# Patient Record
Sex: FEMALE | URBAN - METROPOLITAN AREA
[De-identification: names, ages, dates, MRNs, and addresses within clinical notes are randomized per-mention and may not be internally consistent; named-entity substitution may affect disease eponyms.]

---

## 2021-05-11 ENCOUNTER — TELEPHONE (OUTPATIENT)
Dept: NEUROSURGERY | Facility: CLINIC | Age: 71
End: 2021-05-11

## 2021-05-11 ENCOUNTER — DOCUMENTATION (OUTPATIENT)
Dept: NEUROSURGERY | Facility: CLINIC | Age: 71
End: 2021-05-11

## 2021-05-11 NOTE — TELEPHONE ENCOUNTER
Patient called nurseline stating she is playing phone tag with out office  Returned call to patient and left a voicemail to call at her earliest convenience  Provided office number

## 2023-01-21 ENCOUNTER — OFFICE VISIT (OUTPATIENT)
Age: 73
End: 2023-01-21

## 2023-01-21 VITALS
HEART RATE: 86 BPM | DIASTOLIC BLOOD PRESSURE: 82 MMHG | SYSTOLIC BLOOD PRESSURE: 124 MMHG | TEMPERATURE: 98.6 F | RESPIRATION RATE: 18 BRPM | OXYGEN SATURATION: 98 %

## 2023-01-21 DIAGNOSIS — K04.7 DENTAL ABSCESS: Primary | ICD-10-CM

## 2023-01-21 RX ORDER — DOXYCYCLINE HYCLATE 100 MG
TABLET ORAL
COMMUNITY
Start: 2022-11-14

## 2023-01-21 RX ORDER — AMOXICILLIN 500 MG/1
500 TABLET, FILM COATED ORAL 3 TIMES DAILY
Qty: 21 TABLET | Refills: 0 | Status: SHIPPED | OUTPATIENT
Start: 2023-01-21 | End: 2023-01-28

## 2023-01-21 NOTE — PROGRESS NOTES
[facility]    NAME: Byron Caruso is a 67 y o  female  : 1950    MRN: [id number]  DATE: 2023  TIME: 9:45 AM    Assessment and Plan   Dental abscess [K04 7]  1  Dental abscess  amoxicillin (AMOXIL) 500 MG tablet          Patient Instructions     Patient Instructions     Take antibiotic as instructed  May continue Ibuprofen as needed  Warm salt water swishes  Get in with dentist as soon as possible  Dental Abscess   AMBULATORY CARE:   A dental abscess  is a collection of pus in or around a tooth  A dental abscess is caused by bacteria  The bacteria can enter the tooth when the enamel (outer part of the tooth) is damaged by tooth decay  Bacteria can also enter the tooth through a chip in the tooth or a cut in the gum  Food particles that are stuck between the teeth for a long time may also lead to an abscess  Common signs and symptoms:   · Toothache, a loose tooth, or a tooth that is very sensitive to pressure or temperature    · Bad breath, unpleasant taste, and drooling    · Fever    · Pain, redness, and swelling of the gums, or swelling of your face and neck    · Pain when you open or close your mouth    · Trouble opening your mouth    Seek care immediately if:   · You have severe pain in your tooth or jaw  · You have trouble breathing because of pain or swelling  Call your doctor if:   · Your symptoms get worse, even after treatment  · Your mouth is bleeding  · You cannot eat or drink because of pain or swelling  · Your abscess returns  · You have an injury that causes a crack in your tooth  · You have questions or concerns about your condition or care  Treatment:  You may  need any of the following:  · Medicines  may be given to treat a bacterial infection and decrease pain  · Incision and drainage  is a cut in the abscess to allow the pus to drain  A sample of fluid may be collected from your abscess   The fluid is sent to a lab and tested for bacteria  Ask your healthcare provider for more information  · A root canal  is a procedure to remove the bacteria and prevent more infection  It is usually done after an incision and drainage  A filling or crown will be placed over the tooth after you have healed from your root canal      · Tooth removal  may be needed if the infection affects deeper tissues  This is usually done after an incision and drainage  Self-care:   · Rinse your mouth every 2 hours with salt water  This will help keep the area clean  · Gently brush your teeth twice a day with a soft tooth brush  This will help keep the area clean  · Eat soft foods as directed  Soft foods may cause less pain  Examples include applesauce, yogurt, and cooked pasta  Ask your healthcare provider how long to follow this instruction  · Apply a warm compress to your tooth or gum  Use a cotton ball or gauze soaked in warm water  Remove the compress in 10 minutes or when it becomes cool  Repeat 3 times a day  Prevent another abscess:   · Brush your teeth at least 2 times a day  with fluoride toothpaste  · Use dental floss at least once a day  to clean between your teeth  · Rinse your mouth with water or mouthwash  after meals and snacks  Chew sugarless gum  · Avoid sugary and starchy food that can stick between your teeth  Limit drinks high in sugar, such as soda or fruit juice  · See your dentist every 6 months  for dental cleanings and oral exams  Follow up with your doctor or dentist as directed: Your healthcare provider will need to check your teeth and gums  Write down your questions so you remember to ask them during your visits  © Copyright [employer] 2022 Information is for End User's use only and may not be sold, redistributed or otherwise used for commercial purposes   All illustrations and images included in CareNotes® are the copyrighted property of [employer]  or Jayne Jordan  The above information is an  only  It is not intended as medical advice for individual conditions or treatments  Talk to your doctor, nurse or pharmacist before following any medical regimen to see if it is safe and effective for you  Chief Complaint     Chief Complaint   Patient presents with   • Dental Pain     Patient with a  bad tooth to the upper left side of mouth since THursday  Now has swelling and pain       History of Present Illness   Caridad Steve presents to the clinic c/o  66 yo female comes in with dental pain  Started Thursday  No fever or chills but upper left molar and gum area swollen red tender and now pain is going into her ear  Has been taking ibuprofen  Does have good dentist and will be contacting her on Monday  Patient says she tends to neglect herself because 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, 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  Dental Pain         Review of Systems   Review of Systems   Constitutional: Negative  HENT: Positive for dental problem and ear pain  Negative for ear discharge  Hematological: Negative for adenopathy  Current Medications     Long-Term Medications   Medication Sig Dispense Refill   • Cholecalciferol 50 MCG (2000 UT) CAPS Take 1 capsule by mouth daily         Current Allergies     Allergies as of 01/21/2023 - Reviewed 01/21/2023   Allergen Reaction Noted   • Codeine GI Intolerance 01/21/2023          The following portions of the patient's history were reviewed and updated as appropriate: allergies, current medications, past family history, past medical history, past social history, past surgical history and problem list   History reviewed  No pertinent past medical history  Past Surgical History:   Procedure Laterality Date   • CHOLECYSTECTOMY       History reviewed  No pertinent family history      Objective   /82   Pulse 86   Temp 98 6 °F (37 °C) (Tympanic)   Resp 18   SpO2 98% No LMP recorded  Patient is postmenopausal        Physical Exam     Physical Exam  Vitals and nursing note reviewed  Constitutional:       General: She is not in acute distress  Appearance: Normal appearance  She is well-developed  She is not ill-appearing, toxic-appearing or diaphoretic  Comments: Very pleasant   HENT:      Head: Normocephalic and atraumatic  Right Ear: Tympanic membrane, ear canal and external ear normal  There is no impacted cerumen  Left Ear: Tympanic membrane, ear canal and external ear normal  There is no impacted cerumen  Mouth/Throat:     Cardiovascular:      Rate and Rhythm: Normal rate  Pulmonary:      Effort: Pulmonary effort is normal  No respiratory distress  Musculoskeletal:      Cervical back: Normal range of motion and neck supple  No rigidity or tenderness  Lymphadenopathy:      Cervical: No cervical adenopathy  Skin:     General: Skin is warm and dry  Findings: No bruising, erythema or lesion  Neurological:      Mental Status: She is alert and oriented to person, place, and time     Psychiatric:         Mood and Affect: Mood normal          Behavior: Behavior normal

## 2023-01-21 NOTE — PATIENT INSTRUCTIONS
Take antibiotic as instructed  May continue Ibuprofen as needed  Warm salt water swishes  Get in with dentist as soon as possible  Dental Abscess   AMBULATORY CARE:   A dental abscess  is a collection of pus in or around a tooth  A dental abscess is caused by bacteria  The bacteria can enter the tooth when the enamel (outer part of the tooth) is damaged by tooth decay  Bacteria can also enter the tooth through a chip in the tooth or a cut in the gum  Food particles that are stuck between the teeth for a long time may also lead to an abscess  Common signs and symptoms:   Toothache, a loose tooth, or a tooth that is very sensitive to pressure or temperature    Bad breath, unpleasant taste, and drooling    Fever    Pain, redness, and swelling of the gums, or swelling of your face and neck    Pain when you open or close your mouth    Trouble opening your mouth    Seek care immediately if:   You have severe pain in your tooth or jaw  You have trouble breathing because of pain or swelling  Call your doctor if:   Your symptoms get worse, even after treatment  Your mouth is bleeding  You cannot eat or drink because of pain or swelling  Your abscess returns  You have an injury that causes a crack in your tooth  You have questions or concerns about your condition or care  Treatment:  You may  need any of the following:  Medicines  may be given to treat a bacterial infection and decrease pain  Incision and drainage  is a cut in the abscess to allow the pus to drain  A sample of fluid may be collected from your abscess  The fluid is sent to a lab and tested for bacteria  Ask your healthcare provider for more information  A root canal  is a procedure to remove the bacteria and prevent more infection  It is usually done after an incision and drainage   A filling or crown will be placed over the tooth after you have healed from your root canal      Tooth removal  may be needed if the infection affects deeper tissues  This is usually done after an incision and drainage  Self-care:   Rinse your mouth every 2 hours with salt water  This will help keep the area clean  Gently brush your teeth twice a day with a soft tooth brush  This will help keep the area clean  Eat soft foods as directed  Soft foods may cause less pain  Examples include applesauce, yogurt, and cooked pasta  Ask your healthcare provider how long to follow this instruction  Apply a warm compress to your tooth or gum  Use a cotton ball or gauze soaked in warm water  Remove the compress in 10 minutes or when it becomes cool  Repeat 3 times a day  Prevent another abscess:   Brush your teeth at least 2 times a day  with fluoride toothpaste  Use dental floss at least once a day  to clean between your teeth  Rinse your mouth with water or mouthwash  after meals and snacks  Chew sugarless gum  Avoid sugary and starchy food that can stick between your teeth  Limit drinks high in sugar, such as soda or fruit juice  See your dentist every 6 months  for dental cleanings and oral exams  Follow up with your doctor or dentist as directed: Your healthcare provider will need to check your teeth and gums  Write down your questions so you remember to ask them during your visits  © Copyright [employer] 2022 Information is for End User's use only and may not be sold, redistributed or otherwise used for commercial purposes  All illustrations and images included in CareNotes® are the copyrighted property of [employer]  or Jayne Jordan  The above information is an  only  It is not intended as medical advice for individual conditions or treatments  Talk to your doctor, nurse or pharmacist before following any medical regimen to see if it is safe and effective for you

## 2023-05-30 ENCOUNTER — OFFICE VISIT (OUTPATIENT)
Age: 73
End: 2023-05-30

## 2023-05-30 VITALS
WEIGHT: 135 LBS | DIASTOLIC BLOOD PRESSURE: 76 MMHG | TEMPERATURE: 99.4 F | HEIGHT: 64 IN | SYSTOLIC BLOOD PRESSURE: 120 MMHG | BODY MASS INDEX: 23.05 KG/M2 | OXYGEN SATURATION: 98 % | RESPIRATION RATE: 18 BRPM | HEART RATE: 98 BPM

## 2023-05-30 DIAGNOSIS — B96.89 ACUTE BRONCHITIS, BACTERIAL: Primary | ICD-10-CM

## 2023-05-30 DIAGNOSIS — J20.8 ACUTE BRONCHITIS, BACTERIAL: Primary | ICD-10-CM

## 2023-05-30 DIAGNOSIS — J06.9 ACUTE URI: ICD-10-CM

## 2023-05-30 RX ORDER — AZITHROMYCIN 250 MG/1
TABLET, FILM COATED ORAL
Qty: 6 TABLET | Refills: 0 | Status: SHIPPED | OUTPATIENT
Start: 2023-05-30 | End: 2023-06-03

## 2023-05-30 RX ORDER — BENZONATATE 200 MG/1
200 CAPSULE ORAL 3 TIMES DAILY PRN
Qty: 20 CAPSULE | Refills: 0 | Status: SHIPPED | OUTPATIENT
Start: 2023-05-30

## 2023-05-30 NOTE — PATIENT INSTRUCTIONS
Start and complete course of antibiotics  Take Tessalon every 8 hours as needed for cough  For decongestion:  Claritin-D or Zyrtec-D  Nasal corticosteroid: examples are Flonase or Nasacort  For Cough or sore throat:  Salt water gurgle  Honey  Chloraseptic spray  Throat lozenges  Tessalon Perles    Follow up with PCP in 3-5 days  Proceed to ER if symptoms worsen

## 2023-05-30 NOTE — PROGRESS NOTES
[facility]        NAME: Domenica Smallwood is a 67 y o  female  : 1950    MRN: [id number]  DATE: May 30, 2023  TIME: 12:48 PM    Assessment and Plan   Acute bronchitis, bacterial [J20 8, B96 89]  1  Acute bronchitis, bacterial  benzonatate (TESSALON) 200 MG capsule    azithromycin (ZITHROMAX) 250 mg tablet      2  Acute URI  benzonatate (TESSALON) 200 MG capsule    azithromycin (ZITHROMAX) 250 mg tablet            Patient Instructions   · Start and complete course of antibiotics  · Take Tessalon every 8 hours as needed for cough  • For decongestion:  o Claritin-D or Zyrtec-D  o Nasal corticosteroid: examples are Flonase or Nasacort  • For Cough or sore throat:  o Salt water gurgle  o Honey  o Chloraseptic spray  o Throat lozenges  o Tessalon Perles    • Follow up with PCP in 3-5 days  • Proceed to ER if symptoms worsen  Chief Complaint     Chief Complaint   Patient presents with   • Cold Like Symptoms     Chest congestion,nasal congestion and cough x 1 week          History of Present Illness       67year old female who reports she had congestion, runny nose, sore throat, and cough for about 7 days  States that about 2-3 days ago symptoms had worsen into her upper chest  States no fever but does take her daughter to the infusion center several times a week  Reports she has done salt water gurgles, honey, nasal rinse all without resolution of symptoms  Review of Systems   Review of Systems   Constitutional: Negative for chills and fever  HENT: Positive for congestion, postnasal drip and sore throat  Negative for dental problem and ear pain  Eyes: Negative for pain and visual disturbance  Respiratory: Positive for cough  Negative for shortness of breath  Cardiovascular: Negative for chest pain and palpitations  Gastrointestinal: Negative for abdominal pain and vomiting  Genitourinary: Negative for dysuria and hematuria  Musculoskeletal: Negative for arthralgias and back pain  "  Skin: Negative for color change and rash  Neurological: Negative for dizziness, seizures, syncope, weakness and numbness  All other systems reviewed and are negative  Current Medications       Current Outpatient Medications:   •  azithromycin (ZITHROMAX) 250 mg tablet, Take 2 tablets today then 1 tablet daily x 4 days, Disp: 6 tablet, Rfl: 0  •  benzonatate (TESSALON) 200 MG capsule, Take 1 capsule (200 mg total) by mouth 3 (three) times a day as needed for cough, Disp: 20 capsule, Rfl: 0  •  Cholecalciferol 50 MCG (2000 UT) CAPS, Take 1 capsule by mouth daily, Disp: , Rfl:   •  Multiple Vitamin (MULTIVITAMIN ADULT PO), Take by mouth, Disp: , Rfl:   •  Omega-3 Fatty Acids (FISH OIL OMEGA-3 PO), Take 1 g by mouth daily, Disp: , Rfl:   •  doxycycline hyclate (VIBRA-TABS) 100 mg tablet, TAKE 1 TABLET BY MOUTH TWICE A DAY FOR 14 DAYS (Patient not taking: Reported on 1/21/2023), Disp: , Rfl:     Current Allergies     Allergies as of 05/30/2023 - Reviewed 05/30/2023   Allergen Reaction Noted   • Codeine GI Intolerance 01/21/2023            The following portions of the patient's history were reviewed and updated as appropriate: allergies, current medications, past family history, past medical history, past social history, past surgical history and problem list      History reviewed  No pertinent past medical history  Past Surgical History:   Procedure Laterality Date   • CHOLECYSTECTOMY     • FRACTURE SURGERY Left     left arm       History reviewed  No pertinent family history  Medications have been verified  Objective   /76   Pulse 98   Temp 99 4 °F (37 4 °C) (Tympanic)   Resp 18   Ht 5' 4\" (1 626 m)   Wt 61 2 kg (135 lb)   SpO2 98%   BMI 23 17 kg/m²   No LMP recorded  Patient is postmenopausal        Physical Exam     Physical Exam  Vitals reviewed  HENT:      Head: Normocephalic        Right Ear: Tympanic membrane normal       Left Ear: Tympanic membrane normal       Nose: " Congestion and rhinorrhea present  Mouth/Throat:      Mouth: Mucous membranes are moist    Eyes:      Pupils: Pupils are equal, round, and reactive to light  Cardiovascular:      Rate and Rhythm: Normal rate  Pulses: Normal pulses  Heart sounds: Normal heart sounds  Pulmonary:      Effort: Pulmonary effort is normal       Breath sounds: Normal breath sounds  Abdominal:      General: Bowel sounds are normal       Tenderness: There is no abdominal tenderness  Musculoskeletal:         General: Normal range of motion  Cervical back: Normal range of motion and neck supple  Skin:     General: Skin is dry  Capillary Refill: Capillary refill takes less than 2 seconds  Neurological:      General: No focal deficit present  Mental Status: She is alert and oriented to person, place, and time  Mental status is at baseline     Psychiatric:         Mood and Affect: Mood normal          Behavior: Behavior normal

## 2024-03-06 ENCOUNTER — OFFICE VISIT (OUTPATIENT)
Age: 74
End: 2024-03-06
Payer: COMMERCIAL

## 2024-03-06 VITALS
HEART RATE: 73 BPM | RESPIRATION RATE: 16 BRPM | HEIGHT: 64 IN | DIASTOLIC BLOOD PRESSURE: 86 MMHG | OXYGEN SATURATION: 97 % | BODY MASS INDEX: 22.2 KG/M2 | WEIGHT: 130 LBS | TEMPERATURE: 98.1 F | SYSTOLIC BLOOD PRESSURE: 150 MMHG

## 2024-03-06 DIAGNOSIS — R68.89 FLU-LIKE SYMPTOMS: Primary | ICD-10-CM

## 2024-03-06 LAB
SARS-COV-2 AG UPPER RESP QL IA: NEGATIVE
VALID CONTROL: NORMAL

## 2024-03-06 PROCEDURE — 99213 OFFICE O/P EST LOW 20 MIN: CPT | Performed by: EMERGENCY MEDICINE

## 2024-03-06 PROCEDURE — 87811 SARS-COV-2 COVID19 W/OPTIC: CPT | Performed by: EMERGENCY MEDICINE

## 2024-03-06 RX ORDER — PREDNISONE 10 MG/1
TABLET ORAL
Qty: 24 TABLET | Refills: 0 | Status: SHIPPED | OUTPATIENT
Start: 2024-03-06

## 2024-03-06 RX ORDER — BENZONATATE 200 MG/1
200 CAPSULE ORAL
Qty: 12 CAPSULE | Refills: 0 | Status: SHIPPED | OUTPATIENT
Start: 2024-03-06

## 2024-03-06 NOTE — PROGRESS NOTES
[facility]        NAME: Pauline Menon is a 73 y.o. female  : 1950    MRN: [id number]  DATE: 2024  TIME: 2:19 PM    Assessment and Plan   Flu-like symptoms [R68.89]  1. Flu-like symptoms  Poct Covid 19 Rapid Antigen Test    predniSONE 10 mg tablet    benzonatate (TESSALON) 200 MG capsule            Patient Instructions     Patient Instructions   You have been diagnosed with a Flu like illness and your symptoms should resolve over the next 7 to 10 days with the treatments recommended today.  If they do not, it is possible that you have developed a bacterial infection and you should return. If you were to take an antibiotic while you are still in the viral stage, you will not get better any faster, but could kill off good germs in your body as well as make germs resistant to the antibiotic.  Take an expectorant - guaifenesin should be the only ingredient - during the day, and the cough suppressant (ex. Robitussin DM or Tessalon) if needed at night only.   Take Zinc 25 mg every 12 hours for the next week (the dose is important so do not just take a multivitamin with zinc or an over-the-counter cold med with zinc such as Airborne or Zicam, as that will not give you the sufficient dose).  You should also take   You should also take vitamin D3 5000 i.u.s per day for the next 1 week, and vitamin-C 1000 mg twice daily (and again dosages are important, do not think you are getting enough vitamin C just by drinking extra orange juice).  May take Flonase as discussed.  You may also take a decongestant like Sudafed, unless you have hypertension or cardiac disease.  You may take Imodium for diarrhea according to package instructions.     If you are diabetic you should adhere strictly to your diabetic diet and monitor blood sugar closely while on prednisone and you should discontinue the prednisone if blood sugar becomes significantly elevated.  Avoid nonsteroidal anti-inflammatories like Advil or  Aleve while on prednisone.       Flu-like illness   AMBULATORY CARE:   Flu-like illness is an infection caused by a virus. The flu is easily spread when an infected person coughs, sneezes, or has close contact with others. You may be able to spread the flu to others for 1 week or longer after signs or symptoms appear.   Common signs and symptoms include the following:   Fever and chills    Headaches, body aches, and muscle or joint pain    Cough, runny nose, and sore throat    Loss of appetite, nausea, vomiting, or diarrhea    Tiredness    Trouble breathing  Call 911 for any of the following:   You have trouble breathing, and your lips look purple or blue.    You have a seizure.  Seek care immediately if:   You are dizzy, or you are urinating less or not at all.     You have a headache with a stiff neck, and you feel tired or confused.    You have new pain or pressure in your chest.    Your symptoms, such as shortness of breath, vomiting, or diarrhea, get worse.     Your symptoms, such as fever and coughing, seem to get better, but then get worse.  Contact your healthcare provider if:   You have new muscle pain or weakness.    You have questions or concerns about your condition or care.  Treatment for influenza  may include any of the following:  Acetaminophen decreases pain and fever. It is available without a doctor's order. Ask how much to take and how often to take it. Follow directions. Acetaminophen can cause liver damage if not taken correctly.    NSAIDs  such as ibuprofen, help decrease swelling, pain, and fever. This medicine is available with or without a doctor's order. NSAIDs can cause stomach bleeding or kidney problems in certain people. If you take blood thinner medicine, always ask your healthcare provider if NSAIDs are safe for you. Always read the medicine label and follow directions.    Antivirals  help fight a viral infection.  Manage your symptoms:   Rest  as much as you can to help you  recover.    Drink liquids as directed  to help prevent dehydration. Ask how much liquid to drink each day and which liquids are best for you.  Prevent the spread of the flu:   Wash your hands often.  Use soap and water. Wash your hands after you use the bathroom, change a child's diapers, or sneeze. Wash your hands before you prepare or eat food. Use gel hand cleanser when soap and water are not available. Do not touch your eyes, nose, or mouth unless you have washed your hands first.       Cover your mouth when you sneeze or cough.  Cough into a tissue or the bend of your arm.    Clean shared items with a germ-killing .  Clean table surfaces, doorknobs, and light switches. Do not share towels, silverware, and dishes with people who are sick. Wash bed sheets, towels, silverware, and dishes with soap and water.     Wear a mask  over your mouth and nose if you are sick or are near anyone who is sick.     Stay away from others  if you are sick.     Follow up with your healthcare provider as directed:  Write down your questions so you remember to ask them during your visits.   © 2017 [employer] Information is for End User's use only and may not be sold, redistributed or otherwise used for commercial purposes. All illustrations and images included in CareNotes® are the copyrighted property of [employer] or [employer].  The above information is an  only. It is not intended as medical advice for individual conditions or treatments. Talk to your doctor, nurse or pharmacist before following any medical regimen to see if it is safe and effective for you.       COVID-19    COVID-19 Home Care Guidelines     Your healthcare provider and/or public health staff have evaluated you and have determined that you do not need to be hospitalized at this time.  At this time you can be isolated at home where you will be monitored by staff from your local or state health department.  You should carefully follow the prevention and isolation steps below until a healthcare provider or local or state health department says that you can return to your normal activities.        Stay home except to get medical care     People who are mildly ill with COVID-19 are able to isolate at home during their illness. You should restrict activities outside your home, except for getting medical care. Do not go to work, school, or public areas. Avoid using public transportation, ride-sharing, or taxis.     Separate yourself from other people and animals in your home     People: As much as possible, you should stay in a specific room and away from other people in your home. Also, you should use a separate bathroom, if available.  Animals: You should restrict contact with pets and other animals while you are sick with COVID-19, just like you would around other people. Although there have not been reports of pets or other animals becoming sick with COVID-19, it is still recommended that people sick with COVID-19 limit contact with animals until more information is known about the virus. When possible, have another member of your household care for your animals while you are sick. If you are sick with COVID-19, avoid contact with your pet, including petting, snuggling, being kissed or licked, and sharing food. If you must care for your pet or be around animals while you are sick, wash your hands before and after you interact with pets and wear a facemask. See COVID-19 and Animals for more information.     Call ahead before visiting your doctor     If you have a medical appointment, call the healthcare provider and tell them that you have or may have COVID-19. This will help the healthcare provider's office take steps to keep other people from getting infected or exposed.     Wear a facemask     You should wear a facemask when you are around other people (e.g., sharing a room or vehicle) or pets and before you enter a  healthcare provider's office. If you are not able to wear a facemask (for example, because it causes trouble breathing), then people who live with you should not stay in the same room with you, or they should wear a facemask if they enter your room.     Cover your coughs and sneezes     Cover your mouth and nose with a tissue when you cough or sneeze. Throw used tissues in a lined trash can. Immediately wash your hands with soap and water for at least 20 seconds or, if soap and water are not available, clean your hands with an alcohol-based hand  that contains at least 60% alcohol.     Clean your hands often     Wash your hands often with soap and water for at least 20 seconds, especially after blowing your nose, coughing, or sneezing; going to the bathroom; and before eating or preparing food. If soap and water are not readily available, use an alcohol-based hand  with at least 60% alcohol, covering all surfaces of your hands and rubbing them together until they feel dry.  Soap and water are the best option if hands are visibly dirty. Avoid touching your eyes, nose, and mouth with unwashed hands.     Avoid sharing personal household items     You should not share dishes, drinking glasses, cups, eating utensils, towels, or bedding with other people or pets in your home. After using these items, they should be washed thoroughly with soap and water.     Clean all “high-touch” surfaces everyday     High touch surfaces include counters, tabletops, doorknobs, bathroom fixtures, toilets, phones, keyboards, tablets, and bedside tables. Also, clean any surfaces that may have blood, stool, or body fluids on them. Use a household cleaning spray or wipe, according to the label instructions. Labels contain instructions for safe and effective use of the cleaning product including precautions you should take when applying the product, such as wearing gloves and making sure you have good ventilation during use of  the product.     Monitor your symptoms     Seek prompt medical attention if your illness is worsening (e.g., difficulty breathing). Before seeking care, call your healthcare provider and tell them that you have, or are being evaluated for, COVID-19. Put on a facemask before you enter the facility. These steps will help the healthcare provider's office to keep other people in the office or waiting room from getting infected or exposed. Ask your healthcare provider to call the local or state health department. Persons who are placed under active monitoring or facilitated self-monitoring should follow instructions provided by their local health department or occupational health professionals, as appropriate.  If you have a medical emergency and need to call 911, notify the dispatch personnel that you have, or are being evaluated for COVID-19. If possible, put on a facemask before emergency medical services arrive.        Proceed to ER if symptoms worsen        Follow up with PCP in 3-5 days.  Proceed to  ER if symptoms worsen.    Chief Complaint     Chief Complaint   Patient presents with    Cold Like Symptoms     Started last night after 5pm with sinus congestion and chest congestion, cough, low grade fever, slight body aches. Tried home remedies including throat gargle, nasal spray (flonase), advil.          History of Present Illness       Patient complains of cough, congestion, fevers, generalized aches since last evening.         Review of Systems   Review of Systems   Constitutional:  Positive for fever. Negative for appetite change, chills and fatigue.   HENT:  Positive for congestion, rhinorrhea, sinus pressure and sore throat. Negative for trouble swallowing and voice change.    Respiratory:  Positive for cough. Negative for chest tightness, shortness of breath and wheezing.    Cardiovascular:  Negative for chest pain.   Gastrointestinal:  Negative for nausea.   Musculoskeletal:  Positive for myalgias.  "        Current Medications       Current Outpatient Medications:     benzonatate (TESSALON) 200 MG capsule, Take 1 capsule (200 mg total) by mouth daily at bedtime as needed for cough, Disp: 12 capsule, Rfl: 0    Cholecalciferol 50 MCG (2000 UT) CAPS, Take 1 capsule by mouth daily, Disp: , Rfl:     Multiple Vitamin (MULTIVITAMIN ADULT PO), Take by mouth, Disp: , Rfl:     Omega-3 Fatty Acids (FISH OIL OMEGA-3 PO), Take 1 g by mouth daily, Disp: , Rfl:     predniSONE 10 mg tablet, Take once daily all days pills on this schedule 5- 5- 4- 4- 3- 2- 1, Disp: 24 tablet, Rfl: 0    benzonatate (TESSALON) 200 MG capsule, Take 1 capsule (200 mg total) by mouth 3 (three) times a day as needed for cough (Patient not taking: Reported on 3/6/2024), Disp: 20 capsule, Rfl: 0    doxycycline hyclate (VIBRA-TABS) 100 mg tablet, TAKE 1 TABLET BY MOUTH TWICE A DAY FOR 14 DAYS (Patient not taking: Reported on 1/21/2023), Disp: , Rfl:     Current Allergies     Allergies as of 03/06/2024 - Reviewed 03/06/2024   Allergen Reaction Noted    Codeine GI Intolerance 01/21/2023            The following portions of the patient's history were reviewed and updated as appropriate: allergies, current medications, past family history, past medical history, past social history, past surgical history and problem list.     History reviewed. No pertinent past medical history.    Past Surgical History:   Procedure Laterality Date    CHOLECYSTECTOMY      FRACTURE SURGERY Left     left arm       History reviewed. No pertinent family history.      Medications have been verified.        Objective   /86   Pulse 73   Temp 98.1 °F (36.7 °C)   Resp 16   Ht 5' 4\" (1.626 m)   Wt 59 kg (130 lb)   SpO2 97%   BMI 22.31 kg/m²        Physical Exam     Physical Exam  Vitals and nursing note reviewed.   Constitutional:       General: She is not in acute distress.     Appearance: She is well-developed. She is not ill-appearing or toxic-appearing.   HENT:      " Head: Normocephalic and atraumatic.      Right Ear: External ear normal.      Left Ear: External ear normal.      Nose: Mucosal edema and congestion present.      Mouth/Throat:      Pharynx: Posterior oropharyngeal erythema present. No oropharyngeal exudate.      Tonsils: No tonsillar abscesses.   Cardiovascular:      Rate and Rhythm: Normal rate and regular rhythm.   Pulmonary:      Effort: Pulmonary effort is normal. No respiratory distress.      Breath sounds: No wheezing or rales.   Musculoskeletal:      Cervical back: Neck supple.   Skin:     General: Skin is warm and dry.   Neurological:      Mental Status: She is alert and oriented to person, place, and time.   Psychiatric:         Mood and Affect: Mood normal.         Behavior: Behavior normal.         Thought Content: Thought content normal.         Judgment: Judgment normal.

## 2024-03-06 NOTE — PATIENT INSTRUCTIONS
You have been diagnosed with a Flu like illness and your symptoms should resolve over the next 7 to 10 days with the treatments recommended today.  If they do not, it is possible that you have developed a bacterial infection and you should return. If you were to take an antibiotic while you are still in the viral stage, you will not get better any faster, but could kill off good germs in your body as well as make germs resistant to the antibiotic.  Take an expectorant - guaifenesin should be the only ingredient - during the day, and the cough suppressant (ex. Robitussin DM or Tessalon) if needed at night only.   Take Zinc 25 mg every 12 hours for the next week (the dose is important so do not just take a multivitamin with zinc or an over-the-counter cold med with zinc such as Airborne or Zicam, as that will not give you the sufficient dose).  You should also take   You should also take vitamin D3 5000 i.u.s per day for the next 1 week, and vitamin-C 1000 mg twice daily (and again dosages are important, do not think you are getting enough vitamin C just by drinking extra orange juice).  May take Flonase as discussed.  You may also take a decongestant like Sudafed, unless you have hypertension or cardiac disease.  You may take Imodium for diarrhea according to package instructions.     If you are diabetic you should adhere strictly to your diabetic diet and monitor blood sugar closely while on prednisone and you should discontinue the prednisone if blood sugar becomes significantly elevated.  Avoid nonsteroidal anti-inflammatories like Advil or Aleve while on prednisone.       Flu-like illness   AMBULATORY CARE:   Flu-like illness is an infection caused by a virus. The flu is easily spread when an infected person coughs, sneezes, or has close contact with others. You may be able to spread the flu to others for 1 week or longer after signs or symptoms appear.   Common signs and symptoms include the following:   Fever  and chills    Headaches, body aches, and muscle or joint pain    Cough, runny nose, and sore throat    Loss of appetite, nausea, vomiting, or diarrhea    Tiredness    Trouble breathing  Call 911 for any of the following:   You have trouble breathing, and your lips look purple or blue.    You have a seizure.  Seek care immediately if:   You are dizzy, or you are urinating less or not at all.     You have a headache with a stiff neck, and you feel tired or confused.    You have new pain or pressure in your chest.    Your symptoms, such as shortness of breath, vomiting, or diarrhea, get worse.     Your symptoms, such as fever and coughing, seem to get better, but then get worse.  Contact your healthcare provider if:   You have new muscle pain or weakness.    You have questions or concerns about your condition or care.  Treatment for influenza  may include any of the following:  Acetaminophen decreases pain and fever. It is available without a doctor's order. Ask how much to take and how often to take it. Follow directions. Acetaminophen can cause liver damage if not taken correctly.    NSAIDs  such as ibuprofen, help decrease swelling, pain, and fever. This medicine is available with or without a doctor's order. NSAIDs can cause stomach bleeding or kidney problems in certain people. If you take blood thinner medicine, always ask your healthcare provider if NSAIDs are safe for you. Always read the medicine label and follow directions.    Antivirals  help fight a viral infection.  Manage your symptoms:   Rest  as much as you can to help you recover.    Drink liquids as directed  to help prevent dehydration. Ask how much liquid to drink each day and which liquids are best for you.  Prevent the spread of the flu:   Wash your hands often.  Use soap and water. Wash your hands after you use the bathroom, change a child's diapers, or sneeze. Wash your hands before you prepare or eat food. Use gel hand cleanser when soap and  water are not available. Do not touch your eyes, nose, or mouth unless you have washed your hands first.       Cover your mouth when you sneeze or cough.  Cough into a tissue or the bend of your arm.    Clean shared items with a germ-killing .  Clean table surfaces, doorknobs, and light switches. Do not share towels, silverware, and dishes with people who are sick. Wash bed sheets, towels, silverware, and dishes with soap and water.     Wear a mask  over your mouth and nose if you are sick or are near anyone who is sick.     Stay away from others  if you are sick.     Follow up with your healthcare provider as directed:  Write down your questions so you remember to ask them during your visits.   © 2017 [employer] Information is for End User's use only and may not be sold, redistributed or otherwise used for commercial purposes. All illustrations and images included in CareNotes® are the copyrighted property of [employer]. or [employer].  The above information is an  only. It is not intended as medical advice for individual conditions or treatments. Talk to your doctor, nurse or pharmacist before following any medical regimen to see if it is safe and effective for you.       COVID-19    COVID-19 Home Care Guidelines     Your healthcare provider and/or public health staff have evaluated you and have determined that you do not need to be hospitalized at this time.  At this time you can be isolated at home where you will be monitored by staff from your local or state health department. You should carefully follow the prevention and isolation steps below until a healthcare provider or local or state health department says that you can return to your normal activities.        Stay home except to get medical care     People who are mildly ill with COVID-19 are able to isolate at home during their illness. You should restrict activities outside your home, except for  getting medical care. Do not go to work, school, or public areas. Avoid using public transportation, ride-sharing, or taxis.     Separate yourself from other people and animals in your home     People: As much as possible, you should stay in a specific room and away from other people in your home. Also, you should use a separate bathroom, if available.  Animals: You should restrict contact with pets and other animals while you are sick with COVID-19, just like you would around other people. Although there have not been reports of pets or other animals becoming sick with COVID-19, it is still recommended that people sick with COVID-19 limit contact with animals until more information is known about the virus. When possible, have another member of your household care for your animals while you are sick. If you are sick with COVID-19, avoid contact with your pet, including petting, snuggling, being kissed or licked, and sharing food. If you must care for your pet or be around animals while you are sick, wash your hands before and after you interact with pets and wear a facemask. See COVID-19 and Animals for more information.     Call ahead before visiting your doctor     If you have a medical appointment, call the healthcare provider and tell them that you have or may have COVID-19. This will help the healthcare provider's office take steps to keep other people from getting infected or exposed.     Wear a facemask     You should wear a facemask when you are around other people (e.g., sharing a room or vehicle) or pets and before you enter a healthcare provider's office. If you are not able to wear a facemask (for example, because it causes trouble breathing), then people who live with you should not stay in the same room with you, or they should wear a facemask if they enter your room.     Cover your coughs and sneezes     Cover your mouth and nose with a tissue when you cough or sneeze. Throw used tissues in a lined  trash can. Immediately wash your hands with soap and water for at least 20 seconds or, if soap and water are not available, clean your hands with an alcohol-based hand  that contains at least 60% alcohol.     Clean your hands often     Wash your hands often with soap and water for at least 20 seconds, especially after blowing your nose, coughing, or sneezing; going to the bathroom; and before eating or preparing food. If soap and water are not readily available, use an alcohol-based hand  with at least 60% alcohol, covering all surfaces of your hands and rubbing them together until they feel dry.  Soap and water are the best option if hands are visibly dirty. Avoid touching your eyes, nose, and mouth with unwashed hands.     Avoid sharing personal household items     You should not share dishes, drinking glasses, cups, eating utensils, towels, or bedding with other people or pets in your home. After using these items, they should be washed thoroughly with soap and water.     Clean all “high-touch” surfaces everyday     High touch surfaces include counters, tabletops, doorknobs, bathroom fixtures, toilets, phones, keyboards, tablets, and bedside tables. Also, clean any surfaces that may have blood, stool, or body fluids on them. Use a household cleaning spray or wipe, according to the label instructions. Labels contain instructions for safe and effective use of the cleaning product including precautions you should take when applying the product, such as wearing gloves and making sure you have good ventilation during use of the product.     Monitor your symptoms     Seek prompt medical attention if your illness is worsening (e.g., difficulty breathing). Before seeking care, call your healthcare provider and tell them that you have, or are being evaluated for, COVID-19. Put on a facemask before you enter the facility. These steps will help the healthcare provider's office to keep other people in the  office or waiting room from getting infected or exposed. Ask your healthcare provider to call the local or state health department. Persons who are placed under active monitoring or facilitated self-monitoring should follow instructions provided by their local health department or occupational health professionals, as appropriate.  If you have a medical emergency and need to call 911, notify the dispatch personnel that you have, or are being evaluated for COVID-19. If possible, put on a facemask before emergency medical services arrive.        Proceed to ER if symptoms worsen

## 2024-09-11 ENCOUNTER — OFFICE VISIT (OUTPATIENT)
Age: 74
End: 2024-09-11
Payer: COMMERCIAL

## 2024-09-11 VITALS
HEIGHT: 64 IN | HEART RATE: 72 BPM | RESPIRATION RATE: 16 BRPM | TEMPERATURE: 97.6 F | SYSTOLIC BLOOD PRESSURE: 154 MMHG | WEIGHT: 132.94 LBS | BODY MASS INDEX: 22.7 KG/M2 | DIASTOLIC BLOOD PRESSURE: 74 MMHG | OXYGEN SATURATION: 97 %

## 2024-09-11 DIAGNOSIS — K04.7 DENTAL INFECTION: ICD-10-CM

## 2024-09-11 DIAGNOSIS — B96.89 ACUTE BACTERIAL SINUSITIS: Primary | ICD-10-CM

## 2024-09-11 DIAGNOSIS — J01.90 ACUTE BACTERIAL SINUSITIS: Primary | ICD-10-CM

## 2024-09-11 PROCEDURE — 99213 OFFICE O/P EST LOW 20 MIN: CPT

## 2024-09-11 RX ORDER — AMOXICILLIN 875 MG
875 TABLET ORAL 2 TIMES DAILY
Qty: 14 TABLET | Refills: 0 | Status: SHIPPED | OUTPATIENT
Start: 2024-09-11 | End: 2024-09-18

## 2024-09-11 NOTE — PROGRESS NOTES
[facility]        NAME: Pauline Menon is a 74 y.o. female  : 1950    MRN: [id number]  DATE: 2024  TIME: 10:05 AM    Assessment and Plan   Acute bacterial sinusitis [J01.90, B96.89]  1. Acute bacterial sinusitis  amoxicillin (AMOXIL) 875 mg tablet      2. Dental infection  amoxicillin (AMOXIL) 875 mg tablet            Patient Instructions   Take antibiotics as prescribed  For decongestion, Over The Counter medications:  2nd Generation antihistamines with a decongestant such as:   Claritin-D (Loratadine with Pseudoephedrine) or  Zyrtec-D (Cetirizine with Pseudoephedrine) or   Allegra-D (Fexofenadine with Pseudoephedrine) or   Decongestant alone: Pseudoephedrine 60mg PO every 4-6 hours for nasal congestion. Max 240mg in 24 hour period  Claritin or Zyrtec plus -Coricidin HBP (Safe for when you have high blood pressure)  Nasal corticosteroid: examples are Flonase or Nasacort.  Nasal saline irrigation  Humidified air  Warm moist air such as a hot cup of water in a mug, sit at the dining room table with the mug on the table, put a towel over your head to cover over the mug and breath in the warm steam (don't drink the fluid in case you have mucus that drips in).  Vicks Vapor Rub  Over the counter Mucinex and increase you fluid intake.  For dental pain:  Over the Counter Orajel  Dried Cloves which has antiseptic and numbing properties -   Can apply a whole clove to the area and bite down on it to keep in place- can leave on for 30 minutes.  Ground the cloves and mix with some olive oil to make a paste - apply to affected area for 15-20 minutes and then rinse off. Can do this 3 times a day.  Ice pack 15 minutes on and 45 minutes off  Recommend saltwater rinses every 2 hours, use soft toothbrush, and soft food.        Follow up with Primary Care Provider in 3-5 days if not improving.  Proceed to Emergency Department if symptoms worsen.    If tests have been performed at [facility], our office  will contact you with results if changes need to be made to the care plan discussed with you at the visit.  You can review your full results on [facility].    Chief Complaint     Chief Complaint   Patient presents with    Sinusitis     Yellow mucus, post nasal drip.     Dental Pain     Left upper second from back tooth pain. Tooth is broken. Painful for a few days and pt states she feels an abscess in her mouth, cheek is tender as well. Taking advil for pain.          History of Present Illness       Reports nasal congestion, sinus pain, purulent drainage starting about 3 to 4 days ago.  States that around the same time she also is having left upper dental pain from a tooth that is cracked for quite a period of time.  She is unsure if they are related to each other.    Sinusitis  Associated symptoms include congestion, coughing and sinus pressure. Pertinent negatives include no chills, ear pain, shortness of breath or sore throat.   Dental Pain   Associated symptoms include sinus pressure. Pertinent negatives include no fever.       Review of Systems   Review of Systems   Constitutional:  Negative for chills and fever.   HENT:  Positive for congestion, dental problem, postnasal drip, rhinorrhea, sinus pressure and sinus pain. Negative for ear pain and sore throat.    Eyes:  Negative for pain and visual disturbance.   Respiratory:  Positive for cough. Negative for shortness of breath.    Cardiovascular:  Negative for chest pain and palpitations.   Gastrointestinal:  Negative for abdominal pain and vomiting.   Genitourinary:  Negative for dysuria and hematuria.   Musculoskeletal:  Negative for arthralgias and back pain.   Skin:  Negative for color change and rash.   Neurological:  Negative for seizures and syncope.   All other systems reviewed and are negative.        Current Medications       Current Outpatient Medications:     amoxicillin (AMOXIL) 875 mg tablet, Take 1 tablet (875 mg total) by mouth 2 (two)  "times a day for 7 days, Disp: 14 tablet, Rfl: 0    Cholecalciferol 50 MCG (2000 UT) CAPS, Take 1 capsule by mouth daily, Disp: , Rfl:     Multiple Vitamin (MULTIVITAMIN ADULT PO), Take by mouth, Disp: , Rfl:     Omega-3 Fatty Acids (FISH OIL OMEGA-3 PO), Take 1 g by mouth daily, Disp: , Rfl:     Current Allergies     Allergies as of 09/11/2024 - Reviewed 09/11/2024   Allergen Reaction Noted    Codeine GI Intolerance 01/21/2023            The following portions of the patient's history were reviewed and updated as appropriate: allergies, current medications, past family history, past medical history, past social history, past surgical history and problem list.     History reviewed. No pertinent past medical history.    Past Surgical History:   Procedure Laterality Date    CHOLECYSTECTOMY      FRACTURE SURGERY Left     left arm       Family History   Problem Relation Age of Onset    No Known Problems Mother     No Known Problems Father          Medications have been verified.        Objective   /74   Pulse 72   Temp 97.6 °F (36.4 °C)   Resp 16   Ht 5' 4\" (1.626 m)   Wt 60.3 kg (132 lb 15 oz)   SpO2 97%   BMI 22.82 kg/m²   No LMP recorded. Patient is postmenopausal.       Physical Exam     Physical Exam  Vitals and nursing note reviewed.   Constitutional:       Appearance: Normal appearance.   HENT:      Head: Normocephalic and atraumatic.      Right Ear: Tympanic membrane normal.      Left Ear: Tympanic membrane normal.      Nose: Congestion and rhinorrhea present. Rhinorrhea is purulent.      Right Sinus: No maxillary sinus tenderness or frontal sinus tenderness.      Left Sinus: Maxillary sinus tenderness present. No frontal sinus tenderness.      Mouth/Throat:      Mouth: Mucous membranes are moist.      Pharynx: No oropharyngeal exudate or posterior oropharyngeal erythema.     Eyes:      Pupils: Pupils are equal, round, and reactive to light.   Pulmonary:      Effort: Pulmonary effort is normal.      " Breath sounds: Normal breath sounds.   Skin:     General: Skin is warm and dry.      Capillary Refill: Capillary refill takes less than 2 seconds.   Neurological:      General: No focal deficit present.      Mental Status: She is alert and oriented to person, place, and time. Mental status is at baseline.      Sensory: No sensory deficit.      Motor: No weakness.   Psychiatric:         Mood and Affect: Mood normal.         Behavior: Behavior normal.         Thought Content: Thought content normal.

## 2024-09-11 NOTE — PATIENT INSTRUCTIONS
Take antibiotics as prescribed  For decongestion, Over The Counter medications:  2nd Generation antihistamines with a decongestant such as:   Claritin-D (Loratadine with Pseudoephedrine) or  Zyrtec-D (Cetirizine with Pseudoephedrine) or   Allegra-D (Fexofenadine with Pseudoephedrine) or   Decongestant alone: Pseudoephedrine 60mg PO every 4-6 hours for nasal congestion. Max 240mg in 24 hour period  Claritin or Zyrtec plus -Coricidin HBP (Safe for when you have high blood pressure)  Nasal corticosteroid: examples are Flonase or Nasacort.  Nasal saline irrigation  Humidified air  Warm moist air such as a hot cup of water in a mug, sit at the dining room table with the mug on the table, put a towel over your head to cover over the mug and breath in the warm steam (don't drink the fluid in case you have mucus that drips in).  Vicks Vapor Rub  Over the counter Mucinex and increase you fluid intake.  For dental pain:  Over the Counter Orajel  Dried Cloves which has antiseptic and numbing properties -   Can apply a whole clove to the area and bite down on it to keep in place- can leave on for 30 minutes.  Ground the cloves and mix with some olive oil to make a paste - apply to affected area for 15-20 minutes and then rinse off. Can do this 3 times a day.  Ice pack 15 minutes on and 45 minutes off  Recommend saltwater rinses every 2 hours, use soft toothbrush, and soft food.        Follow up with Primary Care Provider in 3-5 days if not improving.  Proceed to Emergency Department if symptoms worsen.    If tests have been performed at [facility], our office will contact you with results if changes need to be made to the care plan discussed with you at the visit.  You can review your full results on [facility] MyChart.

## 2024-11-01 ENCOUNTER — OFFICE VISIT (OUTPATIENT)
Age: 74
End: 2024-11-01
Payer: COMMERCIAL

## 2024-11-01 VITALS
WEIGHT: 135 LBS | OXYGEN SATURATION: 96 % | HEIGHT: 64 IN | DIASTOLIC BLOOD PRESSURE: 74 MMHG | RESPIRATION RATE: 18 BRPM | TEMPERATURE: 97.1 F | SYSTOLIC BLOOD PRESSURE: 132 MMHG | BODY MASS INDEX: 23.05 KG/M2 | HEART RATE: 69 BPM

## 2024-11-01 DIAGNOSIS — B37.2 SKIN YEAST INFECTION: Primary | ICD-10-CM

## 2024-11-01 PROCEDURE — 99213 OFFICE O/P EST LOW 20 MIN: CPT | Performed by: EMERGENCY MEDICINE

## 2024-11-01 RX ORDER — CLOTRIMAZOLE 1 %
CREAM (GRAM) TOPICAL 2 TIMES DAILY
Qty: 28 G | Refills: 0 | Status: SHIPPED | OUTPATIENT
Start: 2024-11-01

## 2024-11-01 NOTE — PATIENT INSTRUCTIONS
Apply Lotrimin 1% cream to affected area twice daily.  Do not cover affected area with bandage or dressing.  Fungal Skin Rash ED   General Information   You came to the Emergency Department (ED) for a skin rash. The doctors believe the rash is caused by a fungus. Ringworm, jock itch, and athlete’s foot are all kinds of fungal skin rash. These rashes can be easy to spread from one person to another or from pets to people. You can also spread it from one part of your body to another.  The doctor may order pills, creams, lotions, or powders to treat your skin rash. Be sure to read all labels and follow directions carefully. It is important not to stop treatment early, even if your rash starts to look better.  What care is needed at home?   Call your regular doctor to let them know you were in the ED. Make a follow-up appointment if you were told to.  Take or use all your medicines as ordered.  If the fungal infection is on your feet:  Keep your feet clean and dry. Dry your feet well after you shower, take a bath, or swim. Take extra care to dry between your toes.  Wear slippers or sandals when at the pool, gym, or in public areas.  Change socks at least one time each day.  If the fungal infection is on your groin:  Keep your groin area clean and dry. Dry your groin well after you shower, take a bath, or swim.  Change underwear at least one time each day.  If the fungal infection is on your scalp:  Get rid of santana, brushes, and items that could have fungus on them.  Do not share santana, brushes, or hats with others.  Wash with soap and shampoo after sports or exercise.  Do not share unwashed clothes or towels with other people.  If the fungal infection may have come from a pet, have the pet checked by a vet. Your pet may need special medicine as well.  When do I need to get emergency help?   Call for an ambulance right away if:   You start to have severe trouble breathing or swallowing (for example, you cannot speak in  full sentences).  Return to the ED if:   It is becoming hard to breathe, but you can still talk in full sentences.  You have a fever of 100.4°F (38°C) or higher or chills.  You have signs of a worsening skin infection like swelling, redness, warmth, pain, or drainage.  When do I need to call the doctor?   Your skin rash does not improve after 7 to 10 days.  You have new or worsening symptoms.  Last Reviewed Date   2021-03-15  Consumer Information Use and Disclaimer   This generalized information is a limited summary of diagnosis, treatment, and/or medication information. It is not meant to be comprehensive and should be used as a tool to help the user understand and/or assess potential diagnostic and treatment options. It does NOT include all information about conditions, treatments, medications, side effects, or risks that may apply to a specific patient. It is not intended to be medical advice or a substitute for the medical advice, diagnosis, or treatment of a health care provider based on the health care provider's examination and assessment of a patient’s specific and unique circumstances. Patients must speak with a health care provider for complete information about their health, medical questions, and treatment options, including any risks or benefits regarding use of medications. This information does not endorse any treatments or medications as safe, effective, or approved for treating a specific patient. [employer] and its affiliates disclaim any warranty or liability relating to this information or the use thereof. The use of this information is governed by the Terms of Use, available at https://www.[employer].com/en/know/clinical-effectiveness-terms   Copyright   Copyright © 2024 [employer] and its affiliates and/or licensors. All rights reserved.

## 2024-11-01 NOTE — PROGRESS NOTES
[facility]        NAME: Pauline Menon is a 74 y.o. female  : 1950    MRN: [id number]  DATE: 2024  TIME: 5:52 PM    Assessment and Plan   Skin yeast infection [B37.2]  1. Skin yeast infection  clotrimazole (LOTRIMIN) 1 % cream            Patient Instructions     Patient Instructions   Apply Lotrimin 1% cream to affected area twice daily.  Do not cover affected area with bandage or dressing.  Fungal Skin Rash ED   General Information   You came to the Emergency Department (ED) for a skin rash. The doctors believe the rash is caused by a fungus. Ringworm, jock itch, and athlete’s foot are all kinds of fungal skin rash. These rashes can be easy to spread from one person to another or from pets to people. You can also spread it from one part of your body to another.  The doctor may order pills, creams, lotions, or powders to treat your skin rash. Be sure to read all labels and follow directions carefully. It is important not to stop treatment early, even if your rash starts to look better.  What care is needed at home?   Call your regular doctor to let them know you were in the ED. Make a follow-up appointment if you were told to.  Take or use all your medicines as ordered.  If the fungal infection is on your feet:  Keep your feet clean and dry. Dry your feet well after you shower, take a bath, or swim. Take extra care to dry between your toes.  Wear slippers or sandals when at the pool, gym, or in public areas.  Change socks at least one time each day.  If the fungal infection is on your groin:  Keep your groin area clean and dry. Dry your groin well after you shower, take a bath, or swim.  Change underwear at least one time each day.  If the fungal infection is on your scalp:  Get rid of santana, brushes, and items that could have fungus on them.  Do not share santana, brushes, or hats with others.  Wash with soap and shampoo after sports or exercise.  Do not share unwashed clothes or  towels with other people.  If the fungal infection may have come from a pet, have the pet checked by a vet. Your pet may need special medicine as well.  When do I need to get emergency help?   Call for an ambulance right away if:   You start to have severe trouble breathing or swallowing (for example, you cannot speak in full sentences).  Return to the ED if:   It is becoming hard to breathe, but you can still talk in full sentences.  You have a fever of 100.4°F (38°C) or higher or chills.  You have signs of a worsening skin infection like swelling, redness, warmth, pain, or drainage.  When do I need to call the doctor?   Your skin rash does not improve after 7 to 10 days.  You have new or worsening symptoms.  Last Reviewed Date   2021-03-15  Consumer Information Use and Disclaimer   This generalized information is a limited summary of diagnosis, treatment, and/or medication information. It is not meant to be comprehensive and should be used as a tool to help the user understand and/or assess potential diagnostic and treatment options. It does NOT include all information about conditions, treatments, medications, side effects, or risks that may apply to a specific patient. It is not intended to be medical advice or a substitute for the medical advice, diagnosis, or treatment of a health care provider based on the health care provider's examination and assessment of a patient’s specific and unique circumstances. Patients must speak with a health care provider for complete information about their health, medical questions, and treatment options, including any risks or benefits regarding use of medications. This information does not endorse any treatments or medications as safe, effective, or approved for treating a specific patient. [employer] and its affiliates disclaim any warranty or liability relating to this information or the use thereof. The use of this information is governed by the Terms of Use, available  at https://www.woltersSpaceFaceuwer.com/en/know/clinical-effectiveness-terms   Copyright   Copyright © 2024 [employer] and its affiliates and/or licensors. All rights reserved.      Follow up with PCP in 3-5 days.  Proceed to  ER if symptoms worsen.    Chief Complaint     Chief Complaint   Patient presents with    Skin Irritation     In her naval. Noticed it yesterday, in the middle of the night. In the morning she cleaned it with a Q tip with soap and water. It is red, but no drainage.          History of Present Illness       Patient complains of umbilical redness and discomfort since last night.  She applied bacitracin without relief then tried to remove the bacitracin using a Q-tip.        Review of Systems   Review of Systems   Constitutional:  Negative for activity change, chills and fever.   Musculoskeletal:  Negative for arthralgias, back pain, joint swelling, myalgias, neck pain and neck stiffness.   Skin:  Positive for color change. Negative for wound.   Neurological:  Negative for dizziness and headaches.         Current Medications       Current Outpatient Medications:     Cholecalciferol 50 MCG (2000 UT) CAPS, Take 1 capsule by mouth daily, Disp: , Rfl:     clotrimazole (LOTRIMIN) 1 % cream, Apply topically 2 (two) times a day, Disp: 28 g, Rfl: 0    Multiple Vitamin (MULTIVITAMIN ADULT PO), Take by mouth, Disp: , Rfl:     Omega-3 Fatty Acids (FISH OIL OMEGA-3 PO), Take 1 g by mouth daily, Disp: , Rfl:     Current Allergies     Allergies as of 11/01/2024 - Reviewed 11/01/2024   Allergen Reaction Noted    Codeine GI Intolerance 01/21/2023            The following portions of the patient's history were reviewed and updated as appropriate: allergies, current medications, past family history, past medical history, past social history, past surgical history and problem list.     History reviewed. No pertinent past medical history.    Past Surgical History:   Procedure Laterality Date    CHOLECYSTECTOMY       "FRACTURE SURGERY Left     left arm       Family History   Problem Relation Age of Onset    No Known Problems Mother     No Known Problems Father          Medications have been verified.        Objective   /74   Pulse 69   Temp (!) 97.1 °F (36.2 °C)   Resp 18   Ht 5' 4\" (1.626 m)   Wt 61.2 kg (135 lb)   SpO2 96%   BMI 23.17 kg/m²        Physical Exam     Physical Exam  Vitals and nursing note reviewed.   Constitutional:       General: She is not in acute distress.     Appearance: Normal appearance. She is well-developed. She is not ill-appearing.   HENT:      Head: Normocephalic and atraumatic.      Right Ear: Tympanic membrane normal.      Left Ear: Tympanic membrane normal.      Nose: Mucosal edema present.      Mouth/Throat:      Mouth: Mucous membranes are moist.      Pharynx: Oropharynx is clear. No oropharyngeal exudate or posterior oropharyngeal erythema.      Tonsils: No tonsillar abscesses.   Musculoskeletal:      Cervical back: Neck supple.   Skin:     General: Skin is warm and dry.      Findings: Erythema and rash present.      Comments: Patient with small umbilicus within which is erythema without significant swelling and no drainage.  No foreign body identified.     Neurological:      Mental Status: She is alert and oriented to person, place, and time.   Psychiatric:         Mood and Affect: Mood normal.         Behavior: Behavior normal.         Thought Content: Thought content normal.         Judgment: Judgment normal.                   "

## 2024-11-17 ENCOUNTER — OFFICE VISIT (OUTPATIENT)
Age: 74
End: 2024-11-17
Payer: COMMERCIAL

## 2024-11-17 VITALS
HEIGHT: 64 IN | DIASTOLIC BLOOD PRESSURE: 82 MMHG | RESPIRATION RATE: 18 BRPM | WEIGHT: 130 LBS | OXYGEN SATURATION: 98 % | HEART RATE: 75 BPM | SYSTOLIC BLOOD PRESSURE: 154 MMHG | TEMPERATURE: 97.9 F | BODY MASS INDEX: 22.2 KG/M2

## 2024-11-17 DIAGNOSIS — R30.0 DYSURIA: Primary | ICD-10-CM

## 2024-11-17 LAB
SL AMB  POCT GLUCOSE, UA: NEGATIVE
SL AMB LEUKOCYTE ESTERASE,UA: NEGATIVE
SL AMB POCT BILIRUBIN,UA: NEGATIVE
SL AMB POCT BLOOD,UA: NEGATIVE
SL AMB POCT CLARITY,UA: NORMAL
SL AMB POCT COLOR,UA: NORMAL
SL AMB POCT KETONES,UA: NEGATIVE
SL AMB POCT NITRITE,UA: NEGATIVE
SL AMB POCT PH,UA: 6
SL AMB POCT SPECIFIC GRAVITY,UA: 1.02
SL AMB POCT URINE PROTEIN: NEGATIVE
SL AMB POCT UROBILINOGEN: 0.2

## 2024-11-17 PROCEDURE — 99213 OFFICE O/P EST LOW 20 MIN: CPT | Performed by: EMERGENCY MEDICINE

## 2024-11-17 PROCEDURE — 81002 URINALYSIS NONAUTO W/O SCOPE: CPT | Performed by: EMERGENCY MEDICINE

## 2024-11-17 PROCEDURE — 87086 URINE CULTURE/COLONY COUNT: CPT | Performed by: EMERGENCY MEDICINE

## 2024-11-17 RX ORDER — PHENAZOPYRIDINE HYDROCHLORIDE 200 MG/1
200 TABLET, FILM COATED ORAL
Qty: 10 TABLET | Refills: 0 | Status: SHIPPED | OUTPATIENT
Start: 2024-11-17

## 2024-11-17 NOTE — PROGRESS NOTES
[facility]        NAME: Pauline Menon is a 74 y.o. female  : 1950    MRN: [id number]  DATE: 2024  TIME: 10:12 AM    Assessment and Plan   Dysuria [R30.0]  1. Dysuria  POCT urine dip    Urine culture    Urine culture    phenazopyridine (PYRIDIUM) 200 mg tablet            Patient Instructions     Patient Instructions    DYSURIA     Your urine dip test today was negative, however just to be sure that you do not have a UTI, we will send your urine specimen for culture. We will call you if there is a positive result and then call in an appropriate antibiotic.    Take the Pyridium as prescribed.  You may take an over the counter cranberry supplement  which may lessen your dysuria symptoms.  Increase the amount of fluids you drink daily to flush infection  Follow up with Gynecologist if your symptoms do not resolve  Go to Emergency Room if you develop a fever greater than 101.4 and back pain  Dysuria Causes  Infections  UTIs are one of the leading causes of painful urination. Infections can happen in any part of the urinary tract, including the:  Kidneys  Ureters (tubes that carry urine from the kidneys to the bladder)  Bladder  Urethra (tube from the bladder that carries urine out of the body)  Urinary tract infections are most often caused by bacteria that get into the urinary tract through the urethra.  Things that can increase your chance of getting a UTI include:  Being a woman  Diabetes  Advanced age  Enlarged prostate  Kidney stones  Pregnancy  Having a urinary catheter in place  Besides painful urination, other symptoms of UTIs include:  Fever  Foul or stronger-smelling urine  Cloudy or bloody urine  Increased urinary frequency or urge to urinate  Pain in your back, sides, or pelvis  Sexually transmitted infections can also cause painful urination. These include:  Genital herpes  Chlamydia  Gonorrhea  Besides painful urination, these sexually transmitted infections can also cause  symptoms such as:  Itching  Burning  Blisters or sores for genital herpes  Abnormal discharge  Inflammation and irritation  A range of problems can lead to inflammation or irritation of the urinary tract or genital area, leading to painful urination. Besides infections, other reasons that area may be irritated or inflamed include:  Irritation of the urethra from sexual activity  Prostatitis, inflammation of the prostate gland  Inflammation of the epididymis, the tube behind each testicle that holds sperm  Interstitial cystitis, a condition caused by bladder inflammation  Vaginal changes related to menopause  Activities such as horseback riding or bicycling  Vaginal sensitivity from scented soaps or bubble bath, toilet paper, or other products such as douches or spermicides  Side effects from certain medications, supplements, and treatments  Tumor in the urinary tract  Sometimes painful urination can be related to a vaginal infection, such as a yeast infection. With vaginal infections, you can also have changes in vaginal discharge and odor.  Kidney stones  When kidney stones move outside the kidney and travel through the urethra, they can sometimes get stuck. This can block your urine flow and become painful. Most often, that pain is felt in your side or back, but sometimes you'll feel burning and pain when you pee.  Certain medications  Prescription antibiotics such as penicillin G and ticarcillin can cause dysuria as can cyclophosphamide, a drug used in cancer treatment, and dopamine, a vasodilator (medicines that expand blood vessels) used to treat some heart and kidney conditions. Natural foods and remedies that can cause dysuria include pumpkin seeds, saw palmetto, and cantharidin, also known as Malay fly.  What is the relationship between dysuria and cystitis?   Cystitis is the medical term for a bladder infection, and dysuria, or painful urination, is a symptom of bladder infections.  Dysuria  Symptoms  People describe dysuria as a stinging, burning, or itching sensation as they start to pee or after they finish.   Dysuria male symptoms  In men and people AMAB, pain after urination may signal a problem with the prostate gland. The pain can remain in the penis before and after urination.   Dysuria female symptoms  Women and people AFAB may feel itching or soreness in the vagina (including during sex) and have an unusual vaginal odor or discharge.  For people of any gender, symptoms that accompany dysuria include:  An urgency to pee and/or frequent urination  Loss of bladder control   Lower abdomen pain  Cloudy or foul-smelling urine  Upper back pain  Nausea and vomiting   Fever with chills  Can dysuria cause penis pain after urination?  Yes. Pain in your penis after urination is usually a sign of a bladder or prostate condition.  What is dysuria dribbling?  Dribbling is a type of incontinence that occurs when your bladder won't completely empty. You can leak, or dribble, urine well after you finish using the bathroom. It can be a symptom of a urinary tract infection, which can cause painful urination.    Dysuria Treatment  Treatment for your dysuria will depend on whether it's caused by inflammation, infection, or other problems in your urinary tract. Infections can be treated with antibiotics. If your pain is severe, your doctor may recommend phenazopyridine, a pain medicine for burning urination that is available by prescription or over the counter (brand names: AZO, Uristat). You can aid your recovery by drinking more water too.  If your dysuria is caused by a yeast infection, you may be given an antifungal pill or suppository to treat it.  How to use baking soda for dysuria  If your dysuria is mild or you're in the early stages of a bladder infection or UTI, you may be able to treat it yourself by drinking a solution made by mixing 1 teaspoon of baking soda (bicarbonate) in a glass of water and  "drinking it two to three times a day. The alkaline in the baking soda may soothe your bladder. But you should not use this remedy if you have high blood pressure or heart disease, or for long periods of time, as baking soda contains high amounts of sodium.  Other ways to treat dysuria or ease the pain:  Avoid alcohol, caffeine, and carbonated or acidic drinks as these can irritate the lining of your bladder.  Place a heating pad or hot water bottle on your lower back.  Get plenty of rest.  Contact your doctor if your symptoms do not get better after 2 or 3 days.    Urinary tract infection - Discharge instructions   The Basics   Written by the doctors and editors at [facility]   What are discharge instructions? -- Discharge instructions are information about how to take care of yourself after getting medical care for a health problem.  What is a urinary tract infection? -- A urinary tract infection (\"UTI\") is an infection that affects either the bladder or the kidneys (figure 1). A kidney infection is more serious, and can lead to other serious problems if it is not treated properly.  You need to take antibiotics to treat a UTI. It is important to take all of your antibiotics, even if you start to feel better.  How do I care for myself at home? -- Ask the doctor or nurse what you should do when you go home. Make sure that you understand exactly what you need to do to care for yourself. Ask questions if there is anything you do not understand.  You should also:   Take all of your medicines as instructed.   Take phenazopyridine (sample brand name: AZO Urinary Pain Relief) for the first day or so, if you choose. This is an over-the-counter medicine. It will help numb your bladder and decrease the urge to urinate. This medicine causes your urine and tears to look orange.   Take acetaminophen (sample brand name: Tylenol) if needed for pain.   Drink extra fluids. This can help prevent more bladder infections. If you have " sex, these things might also help:   Urinate right afterward.   If you use birth control, use a form that does not contain spermicide.  When should I call the doctor? -- Call for advice if:   You have pain in your back, shoulder, or belly.   You have a fever, shaking chills, or sweats even though you are taking antibiotics.   You notice more blood in your urine.   Your symptoms get worse or do not get better within 24 hours of starting antibiotics.   Your symptoms come back after finishing treatment.   You have any new or worrying symptoms.  All topics are updated as new evidence becomes available and our peer review process is complete.  This topic retrieved from [employer] on: Feb 26, 2024.  Topic 630957 Version 1.0  Release: 32.2.4 - C32.56  © 2024 UpToDate, Inc. and/or its affiliates. All rights reserved.  figure 1: Anatomy of the urinary tract     Urine is made by the kidneys. It passes from the kidneys into the bladder through 2 tubes called the ureters. Then, it leaves the bladder through another tube called the urethra.  Graphic 98445 Version 8.0  Consumer Information Use and Disclaimer   Disclaimer: This generalized information is a limited summary of diagnosis, treatment, and/or medication information. It is not meant to be comprehensive and should be used as a tool to help the user understand and/or assess potential diagnostic and treatment options. It does NOT include all information about conditions, treatments, medications, side effects, or risks that may apply to a specific patient. It is not intended to be medical advice or a substitute for the medical advice, diagnosis, or treatment of a health care provider based on the health care provider's examination and assessment of a patient's specific and unique circumstances. Patients must speak with a health care provider for complete information about their health, medical questions, and treatment options, including any risks or benefits regarding use of  medications. This information does not endorse any treatments or medications as safe, effective, or approved for treating a specific patient. UpToDate, Inc. and its affiliates disclaim any warranty or liability relating to this information or the use thereof.The use of this information is governed by the Terms of Use, available at https://www.Jellyvision.com/en/know/clinical-effectiveness-terms. 2024© UpToDate, Inc. and its affiliates and/or licensors. All rights reserved.  Copyright   © 2024 UpToDate, Inc. and/or its affiliates. All rights reserved.      Follow up with PCP in 3-5 days.  Proceed to  ER if symptoms worsen.    Chief Complaint     Chief Complaint   Patient presents with    Possible UTI     C/o burning sensation worse with urination x 1 wk.          History of Present Illness       Patient complains of burning on urination on and off for the past week.        Review of Systems   Review of Systems   Constitutional:  Negative for chills and fever.   Genitourinary:  Positive for dysuria, frequency and urgency. Negative for difficulty urinating, flank pain and hematuria.   Musculoskeletal:  Negative for back pain.         Current Medications       Current Outpatient Medications:     Cholecalciferol 50 MCG (2000 UT) CAPS, Take 1 capsule by mouth daily, Disp: , Rfl:     clotrimazole (LOTRIMIN) 1 % cream, Apply topically 2 (two) times a day, Disp: 28 g, Rfl: 0    Multiple Vitamin (MULTIVITAMIN ADULT PO), Take by mouth, Disp: , Rfl:     Omega-3 Fatty Acids (FISH OIL OMEGA-3 PO), Take 1 g by mouth daily, Disp: , Rfl:     phenazopyridine (PYRIDIUM) 200 mg tablet, Take 1 tablet (200 mg total) by mouth 3 (three) times a day with meals, Disp: 10 tablet, Rfl: 0    Current Allergies     Allergies as of 11/17/2024 - Reviewed 11/17/2024   Allergen Reaction Noted    Codeine GI Intolerance 01/21/2023            The following portions of the patient's history were reviewed and updated as appropriate: allergies, current  "medications, past family history, past medical history, past social history, past surgical history and problem list.     History reviewed. No pertinent past medical history.    Past Surgical History:   Procedure Laterality Date    CHOLECYSTECTOMY      FRACTURE SURGERY Left     left arm       Family History   Problem Relation Age of Onset    No Known Problems Mother     No Known Problems Father          Medications have been verified.        Objective   /82 (BP Location: Left arm, Patient Position: Sitting, Cuff Size: Standard)   Pulse 75   Temp 97.9 °F (36.6 °C)   Resp 18   Ht 5' 4\" (1.626 m)   Wt 59 kg (130 lb)   SpO2 98%   BMI 22.31 kg/m²        Physical Exam     Physical Exam  Vitals and nursing note reviewed.   Constitutional:       General: She is not in acute distress.     Appearance: She is well-developed. She is not ill-appearing or toxic-appearing.   Abdominal:      General: Abdomen is flat. Bowel sounds are normal. There is no distension.      Palpations: Abdomen is soft. There is no mass.      Tenderness: There is no abdominal tenderness. There is no right CVA tenderness, left CVA tenderness, guarding or rebound.   Skin:     General: Skin is warm and dry.      Findings: No rash.   Neurological:      Mental Status: She is alert and oriented to person, place, and time.   Psychiatric:         Mood and Affect: Mood normal.         Behavior: Behavior normal.         Thought Content: Thought content normal.         Judgment: Judgment normal.                   "

## 2024-11-17 NOTE — PATIENT INSTRUCTIONS
DYSURIA     Your urine dip test today was negative, however just to be sure that you do not have a UTI, we will send your urine specimen for culture. We will call you if there is a positive result and then call in an appropriate antibiotic.    Take the Pyridium as prescribed.  You may take an over the counter cranberry supplement  which may lessen your dysuria symptoms.  Increase the amount of fluids you drink daily to flush infection  Follow up with Gynecologist if your symptoms do not resolve  Go to Emergency Room if you develop a fever greater than 101.4 and back pain  Dysuria Causes  Infections  UTIs are one of the leading causes of painful urination. Infections can happen in any part of the urinary tract, including the:  Kidneys  Ureters (tubes that carry urine from the kidneys to the bladder)  Bladder  Urethra (tube from the bladder that carries urine out of the body)  Urinary tract infections are most often caused by bacteria that get into the urinary tract through the urethra.  Things that can increase your chance of getting a UTI include:  Being a woman  Diabetes  Advanced age  Enlarged prostate  Kidney stones  Pregnancy  Having a urinary catheter in place  Besides painful urination, other symptoms of UTIs include:  Fever  Foul or stronger-smelling urine  Cloudy or bloody urine  Increased urinary frequency or urge to urinate  Pain in your back, sides, or pelvis  Sexually transmitted infections can also cause painful urination. These include:  Genital herpes  Chlamydia  Gonorrhea  Besides painful urination, these sexually transmitted infections can also cause symptoms such as:  Itching  Burning  Blisters or sores for genital herpes  Abnormal discharge  Inflammation and irritation  A range of problems can lead to inflammation or irritation of the urinary tract or genital area, leading to painful urination. Besides infections, other reasons that area may be irritated or inflamed include:  Irritation of the  urethra from sexual activity  Prostatitis, inflammation of the prostate gland  Inflammation of the epididymis, the tube behind each testicle that holds sperm  Interstitial cystitis, a condition caused by bladder inflammation  Vaginal changes related to menopause  Activities such as horseback riding or bicycling  Vaginal sensitivity from scented soaps or bubble bath, toilet paper, or other products such as douches or spermicides  Side effects from certain medications, supplements, and treatments  Tumor in the urinary tract  Sometimes painful urination can be related to a vaginal infection, such as a yeast infection. With vaginal infections, you can also have changes in vaginal discharge and odor.  Kidney stones  When kidney stones move outside the kidney and travel through the urethra, they can sometimes get stuck. This can block your urine flow and become painful. Most often, that pain is felt in your side or back, but sometimes you'll feel burning and pain when you pee.  Certain medications  Prescription antibiotics such as penicillin G and ticarcillin can cause dysuria as can cyclophosphamide, a drug used in cancer treatment, and dopamine, a vasodilator (medicines that expand blood vessels) used to treat some heart and kidney conditions. Natural foods and remedies that can cause dysuria include pumpkin seeds, saw palmetto, and cantharidin, also known as [place]iqu[place] fly.  What is the relationship between dysuria and cystitis?   Cystitis is the medical term for a bladder infection, and dysuria, or painful urination, is a symptom of bladder infections.  Dysuria Symptoms  People describe dysuria as a stinging, burning, or itching sensation as they start to pee or after they finish.   Dysuria male symptoms  In men and people AMAB, pain after urination may signal a problem with the prostate gland. The pain can remain in the penis before and after urination.   Dysuria female symptoms  Women and people AFAB may feel itching  or soreness in the vagina (including during sex) and have an unusual vaginal odor or discharge.  For people of any gender, symptoms that accompany dysuria include:  An urgency to pee and/or frequent urination  Loss of bladder control   Lower abdomen pain  Cloudy or foul-smelling urine  Upper back pain  Nausea and vomiting   Fever with chills  Can dysuria cause penis pain after urination?  Yes. Pain in your penis after urination is usually a sign of a bladder or prostate condition.  What is dysuria dribbling?  Dribbling is a type of incontinence that occurs when your bladder won't completely empty. You can leak, or dribble, urine well after you finish using the bathroom. It can be a symptom of a urinary tract infection, which can cause painful urination.    Dysuria Treatment  Treatment for your dysuria will depend on whether it's caused by inflammation, infection, or other problems in your urinary tract. Infections can be treated with antibiotics. If your pain is severe, your doctor may recommend phenazopyridine, a pain medicine for burning urination that is available by prescription or over the counter (brand names: AZO, Uristat). You can aid your recovery by drinking more water too.  If your dysuria is caused by a yeast infection, you may be given an antifungal pill or suppository to treat it.  How to use baking soda for dysuria  If your dysuria is mild or you're in the early stages of a bladder infection or UTI, you may be able to treat it yourself by drinking a solution made by mixing 1 teaspoon of baking soda (bicarbonate) in a glass of water and drinking it two to three times a day. The alkaline in the baking soda may soothe your bladder. But you should not use this remedy if you have high blood pressure or heart disease, or for long periods of time, as baking soda contains high amounts of sodium.  Other ways to treat dysuria or ease the pain:  Avoid alcohol, caffeine, and carbonated or acidic drinks as these  "can irritate the lining of your bladder.  Place a heating pad or hot water bottle on your lower back.  Get plenty of rest.  Contact your doctor if your symptoms do not get better after 2 or 3 days.    Urinary tract infection - Discharge instructions   The Basics   Written by the doctors and editors at [facility]   What are discharge instructions? -- Discharge instructions are information about how to take care of yourself after getting medical care for a health problem.  What is a urinary tract infection? -- A urinary tract infection (\"UTI\") is an infection that affects either the bladder or the kidneys (figure 1). A kidney infection is more serious, and can lead to other serious problems if it is not treated properly.  You need to take antibiotics to treat a UTI. It is important to take all of your antibiotics, even if you start to feel better.  How do I care for myself at home? -- Ask the doctor or nurse what you should do when you go home. Make sure that you understand exactly what you need to do to care for yourself. Ask questions if there is anything you do not understand.  You should also:   Take all of your medicines as instructed.   Take phenazopyridine (sample brand name: AZO Urinary Pain Relief) for the first day or so, if you choose. This is an over-the-counter medicine. It will help numb your bladder and decrease the urge to urinate. This medicine causes your urine and tears to look orange.   Take acetaminophen (sample brand name: Tylenol) if needed for pain.   Drink extra fluids. This can help prevent more bladder infections. If you have sex, these things might also help:   Urinate right afterward.   If you use birth control, use a form that does not contain spermicide.  When should I call the doctor? -- Call for advice if:   You have pain in your back, shoulder, or belly.   You have a fever, shaking chills, or sweats even though you are taking antibiotics.   You notice more blood in your urine.   Your " symptoms get worse or do not get better within 24 hours of starting antibiotics.   Your symptoms come back after finishing treatment.   You have any new or worrying symptoms.  All topics are updated as new evidence becomes available and our peer review process is complete.  This topic retrieved from [employer] on: Feb 26, 2024.  Topic 477040 Version 1.0  Release: 32.2.4 - C32.56  © 2024 [employer] and/or its affiliates. All rights reserved.  figure 1: Anatomy of the urinary tract     Urine is made by the kidneys. It passes from the kidneys into the bladder through 2 tubes called the ureters. Then, it leaves the bladder through another tube called the urethra.  Graphic 99716 Version 8.0  Consumer Information Use and Disclaimer   Disclaimer: This generalized information is a limited summary of diagnosis, treatment, and/or medication information. It is not meant to be comprehensive and should be used as a tool to help the user understand and/or assess potential diagnostic and treatment options. It does NOT include all information about conditions, treatments, medications, side effects, or risks that may apply to a specific patient. It is not intended to be medical advice or a substitute for the medical advice, diagnosis, or treatment of a health care provider based on the health care provider's examination and assessment of a patient's specific and unique circumstances. Patients must speak with a health care provider for complete information about their health, medical questions, and treatment options, including any risks or benefits regarding use of medications. This information does not endorse any treatments or medications as safe, effective, or approved for treating a specific patient. [employer] Inc[employer] and its affiliates disclaim any warranty or liability relating to this information or the use thereof.The use of this information is governed by the Terms of Use, available at  https://www.[employer].com/en/know/clinical-effectiveness-terms. 2024© [employer] and its affiliates and/or licensors. All rights reserved.  Copyright   © 2024 [employer] and/or its affiliates. All rights reserved.

## 2024-11-19 LAB — BACTERIA UR CULT: NORMAL

## 2025-03-25 ENCOUNTER — VBI (OUTPATIENT)
Dept: ADMINISTRATIVE | Facility: OTHER | Age: 75
End: 2025-03-25

## 2025-03-25 NOTE — TELEPHONE ENCOUNTER
03/25/25 10:49 AM     Chart reviewed for CRC: Colonoscopy ; nothing is submitted to the patient's insurance at this time.     Lupe Hurley MA   [facility]

## 2025-04-09 ENCOUNTER — VBI (OUTPATIENT)
Dept: ADMINISTRATIVE | Facility: OTHER | Age: 75
End: 2025-04-09

## 2025-04-09 NOTE — TELEPHONE ENCOUNTER
04/09/25 11:34 AM     Chart reviewed for Mammogram was/were not submitted to the patient's insurance.     Ashley Patton MA   [facility]